# Patient Record
Sex: MALE | Race: WHITE | NOT HISPANIC OR LATINO | Employment: FULL TIME | URBAN - METROPOLITAN AREA
[De-identification: names, ages, dates, MRNs, and addresses within clinical notes are randomized per-mention and may not be internally consistent; named-entity substitution may affect disease eponyms.]

---

## 2021-06-28 ENCOUNTER — TELEPHONE (OUTPATIENT)
Dept: SCHEDULING | Facility: IMAGING CENTER | Age: 56
End: 2021-06-28

## 2021-06-30 ENCOUNTER — OFFICE VISIT (OUTPATIENT)
Dept: MEDICAL GROUP | Facility: PHYSICIAN GROUP | Age: 56
End: 2021-06-30

## 2021-06-30 VITALS
HEART RATE: 92 BPM | RESPIRATION RATE: 12 BRPM | OXYGEN SATURATION: 91 % | BODY MASS INDEX: 37.75 KG/M2 | HEIGHT: 67 IN | WEIGHT: 240.5 LBS | DIASTOLIC BLOOD PRESSURE: 82 MMHG | TEMPERATURE: 97.2 F | SYSTOLIC BLOOD PRESSURE: 130 MMHG

## 2021-06-30 DIAGNOSIS — J22 LOWER RESPIRATORY INFECTION: ICD-10-CM

## 2021-06-30 DIAGNOSIS — R73.03 PREDIABETES: ICD-10-CM

## 2021-06-30 DIAGNOSIS — J45.20 MILD INTERMITTENT ASTHMA WITHOUT COMPLICATION: ICD-10-CM

## 2021-06-30 DIAGNOSIS — J45.21 MILD INTERMITTENT ASTHMA WITH EXACERBATION: ICD-10-CM

## 2021-06-30 DIAGNOSIS — I10 ESSENTIAL HYPERTENSION: ICD-10-CM

## 2021-06-30 DIAGNOSIS — R06.83 SNORES: ICD-10-CM

## 2021-06-30 DIAGNOSIS — E66.9 OBESITY (BMI 30-39.9): ICD-10-CM

## 2021-06-30 DIAGNOSIS — F41.9 ANXIETY: ICD-10-CM

## 2021-06-30 PROCEDURE — 99204 OFFICE O/P NEW MOD 45 MIN: CPT | Performed by: NURSE PRACTITIONER

## 2021-06-30 RX ORDER — PREDNISONE 20 MG/1
20 TABLET ORAL DAILY
Qty: 7 TABLET | Refills: 0 | Status: SHIPPED | OUTPATIENT
Start: 2021-06-30 | End: 2021-07-07

## 2021-06-30 RX ORDER — LISINOPRIL 40 MG/1
40 TABLET ORAL DAILY
COMMUNITY
End: 2021-07-28 | Stop reason: SDUPTHER

## 2021-06-30 RX ORDER — ALBUTEROL SULFATE 90 UG/1
2 AEROSOL, METERED RESPIRATORY (INHALATION) EVERY 6 HOURS PRN
COMMUNITY

## 2021-06-30 RX ORDER — AZITHROMYCIN 250 MG/1
TABLET, FILM COATED ORAL
Qty: 6 TABLET | Refills: 0 | Status: SHIPPED | OUTPATIENT
Start: 2021-06-30 | End: 2021-07-28

## 2021-06-30 ASSESSMENT — PATIENT HEALTH QUESTIONNAIRE - PHQ9
CLINICAL INTERPRETATION OF PHQ2 SCORE: 2
SUM OF ALL RESPONSES TO PHQ QUESTIONS 1-9: 4
5. POOR APPETITE OR OVEREATING: 0 - NOT AT ALL

## 2021-06-30 NOTE — PROGRESS NOTES
CC: Establish care, cough    HISTORY OF THE PRESENT ILLNESS: Patient is a 56 y.o. male. This pleasant patient is here today to establish care and for evaluation and management of the following health problems.    Patient just moved to [place] 2 weeks ago from [place].  [place]    Does not currently have health insurance.  Though he believes his new health insurance will be active in the next couple weeks.      Anxiety  Patient is 56-year-old male here to establish care with me today.  Taking paroxetine 20 mg daily.  Denies any concerns.  Not needing refill only.  Just moved here from [facility] 2 weeks ago.  .  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    Mild intermittent asthma without complication  Patient is 56-year-old male here to establish care with me today.  Reports long history of asthma.  Usually well controlled with albuterol inhaler as needed.  However, about 2 weeks ago after moving here from [place] developed cough, chest tightness, wheezing, shortness of breath.  Associated fatigue.  Denies fever.    Symptoms have been improving over the last few days.  Denies any known exposure to someone with COVID-19.  Managed with albuterol inhaler, Mucinex, primatene tablets.  Has been working.  Does not use maintenance inhaler.    Prediabetes  Patient is 56-year-old male here to establish care with me today.  Patient reports long history of prediabetes.  Denies ever been told he has type 2 diabetes.  Managed with Jardiance 25 mg daily.  Metformin in the past caused severe GI issues.    Snores  Patient is 56-year-old male here to establish care with me today.  Patient reports he believes he has sleep apnea, though has never had sleep study.  He does snore frequently.  Tries to sleep with his head elevated.  Does not want sleep study because he knows he will not wear a CPAP or BiPAP.    Essential hypertension  Patient is 56-year-old male here to establish care with me today.  Chronic health  problem.  Taking lisinopril 40 mg daily.      Allergies: Patient has no known allergies.    Current Outpatient Medications Ordered in Epic   Medication Sig Dispense Refill   • lisinopril (PRINIVIL) 40 MG tablet Take 40 mg by mouth every day.     • Empagliflozin 25 MG Tab Take  by mouth.     • predniSONE (DELTASONE) 20 MG Tab Take 1 tablet by mouth every day for 7 days. 7 tablet 0   • azithromycin (ZITHROMAX) 250 MG Tab Per package directions 6 tablet 0   • albuterol 108 (90 Base) MCG/ACT Aero Soln inhalation aerosol Inhale 2 Puffs every 6 hours as needed.     • fluticasone (FLONASE) 50 MCG/ACT nasal spray Spray 2 Sprays in nose every day. Each Nostril 1 Bottle 11   • paroxetine (PAXIL) 20 MG TABS Take 1 Tab by mouth every day. Take with food 30 Each 11   • ibuprofen (MOTRIN) 200 MG TABS Take 1 Tab by mouth every 6 hours as needed for Mild Pain.  3   • Saline (NASAL SALINE) 0.65 % SOLN Spray  in nose.       No current [facility]-ordered facility-administered medications on file.       Past Medical History:   Diagnosis Date   • Anxiety    • Arthritis    • ASTHMA    • Dyslipidemia     , , HDL 43, nonHDL 167, , TC/HDL 4.88 (6/1/2012)   • Environmental allergies    • Fracture 1972    Left Wrist   • Head ache    • Hyperglycemia 6/1/2012    mild, fasting    • Insomnia    • Obesity    • Pain, joint, multiple sites    • Sinus infection    • Transaminitis 6/1/2012    mild, AST 39       Past Surgical History:   Procedure Laterality Date   • HERNIA REPAIR  1988       Social History     Tobacco Use   • Smoking status: Never Smoker   • Smokeless tobacco: Never Used   Substance Use Topics   • Alcohol use: Yes     Alcohol/week: 3.6 oz     Types: 6 Standard drinks or equivalent per week   • Drug use: No       Family History   Problem Relation Age of Onset   • Diabetes Father    • Arthritis Father    • No Known Problems Sister        ROS:     - Constitutional: Negative for fever, chills, unexpected weight change,  "and fatigue/generalized weakness.     - HEENT: Negative for headaches, vision changes, hearing changes, ear pain, rhinorrhea, and sore throat.   Positive sinus congestion.    - Respiratory: As in HPI    - Cardiovascular: Negative for chest pain, palpitations, orthopnea, and bilateral lower extremity edema.     - Gastrointestinal: Negative for nausea, vomiting, abdominal pain, hematochezia, melena, diarrhea, constipation.  Positive heartburn.    - Genitourinary: Negative for dysuria, polyuria, hematuria, pyuria, urinary urgency, and urinary incontinence.    - Musculoskeletal: Negative for myalgias.  Positive chronic back pain, and joint pain.     - Skin: Negative for rash, itching, cyanotic skin color change.     - Neurological: Negative for dizziness, tingling, tremors, focal sensory deficit, focal weakness and headaches.     - Endo/Heme/Allergies: Does not bruise/bleed easily.     - Psychiatric/Behavioral: Negative for depression, suicidal/homicidal ideation and memory loss.           Exam: /82 (BP Location: Right arm, Patient Position: Sitting, BP Cuff Size: Adult long)   Pulse 92   Temp 36.2 °C (97.2 °F) (Temporal)   Resp 12   Ht 1.702 m (5' 7\")   Wt 109 kg (240 lb 8 oz)   SpO2 91%  Body mass index is 37.67 kg/m².    General: Alert, pleasant, obese habitus, well nourished, well developed male in NAD  HEENT: Normocephalic. Eyes conjunctiva clear lids without ptosis, pupils equal and reactive to light, ears normal shape and contour, canals are clear bilaterally, tympanic membranes are pearly gray with good light reflex, nasal mucosa without erythema and drainage, oropharynx is without erythema, edema or exudates.   Neck: Supple without bruit. Thyroid is not enlarged.  Pulmonary: Diffuse expiratory wheeze, diminished throughout.  Normal effort. No rales, ronchi, or wheezing.  Cardiovascular: Normal rate and rhythm without murmur. Carotid and radial pulses are intact and equal bilaterally.  No lower " extremity edema.  Abdomen: Soft, nontender, nondistended. Normal bowel sounds. Liver and spleen are not palpable  Neurologic: Grossly nonfocal  Lymph: No cervical or supraclavicular lymph nodes are palpable  Skin: Warm and dry.    Musculoskeletal: Normal gait.   Psych: Normal mood and affect. Alert and oriented. Judgment and insight is normal.    Please note that this dictation was created using voice recognition software. I have made every reasonable attempt to correct obvious errors, but I expect that there are errors of grammar and possibly content that I did not discover before finalizing the note.      Assessment/Plan  1. Anxiety  Patient reports well managed with Paxil.  Not needing refill at this time.    2. Lower respiratory infection  Patient having asthma exacerbation with malaise.  Will prescribe prednisone burst in addition to antibiotic.  Continue with albuterol inhaler as needed.  Patient will return to clinic/urgent care/ER if symptoms worsen or do not improve.  He has taken both medications in the past and tolerated.  - azithromycin (ZITHROMAX) 250 MG Tab; Per package directions  Dispense: 6 tablet; Refill: 0    3. Mild intermittent asthma with exacerbation  As in #2.  Consider adding Singulair in the future.  - predniSONE (DELTASONE) 20 MG Tab; Take 1 tablet by mouth every day for 7 days.  Dispense: 7 tablet; Refill: 0    4. Prediabetes  Taking Jardiance.  Not needing refill.  We will review lab results with patient next appointment.  - TSH WITH REFLEX TO FT4; Future  - HEMOGLOBIN A1C; Future    5. Essential hypertension  Taking lisinopril.  Not needing refill.  - Comp Metabolic Panel; Future  - ESTIMATED GFR; Future  - Lipid Profile; Future  - CBC WITHOUT DIFFERENTIAL; Future    6. Mild intermittent asthma without complication  As in #2.    7. Snores  Advised that there are other options for management of sleep apnea including a mouthguard.  Patient will consider.    8. Obesity (BMI 30-39.9)  -  Patient identified as having weight management issue.  Appropriate orders and counseling given.    Patient will return to clinic in 3 to 4 weeks for lab review and follow-up on chronic health problems.  He would like to make sure that his new insurance which will be active tomorrow is contracted with [facility].

## 2021-06-30 NOTE — ASSESSMENT & PLAN NOTE
Patient is 56-year-old male here to establish care with me today.  Taking paroxetine 20 mg daily.  Denies any concerns.  Not needing refill only.  Just moved here from [facility] 2 weeks ago.  .  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

## 2021-06-30 NOTE — ASSESSMENT & PLAN NOTE
Patient is 56-year-old male here to establish care with me today.  Patient reports he believes he has sleep apnea, though has never had sleep study.  He does snore frequently.  Tries to sleep with his head elevated.  Does not want sleep study because he knows he will not wear a CPAP or BiPAP.

## 2021-06-30 NOTE — ASSESSMENT & PLAN NOTE
Patient is 56-year-old male here to establish care with me today.  Reports long history of asthma.  Usually well controlled with albuterol inhaler as needed.  However, about 2 weeks ago after moving here from [place] developed cough, chest tightness, wheezing, shortness of breath.  Associated fatigue.  Denies fever.    Symptoms have been improving over the last few days.  Denies any known exposure to someone with COVID-19.  Managed with albuterol inhaler, Mucinex, primatene tablets.  Has been working.  Does not use maintenance inhaler.

## 2021-06-30 NOTE — ASSESSMENT & PLAN NOTE
Patient is 56-year-old male here to establish care with me today.  Patient reports long history of prediabetes.  Denies ever been told he has type 2 diabetes.  Managed with Jardiance 25 mg daily.  Metformin in the past caused severe GI issues.

## 2021-06-30 NOTE — ASSESSMENT & PLAN NOTE
Patient is 56-year-old male here to establish care with me today.  Chronic health problem.  Taking lisinopril 40 mg daily.

## 2021-07-20 ENCOUNTER — HOSPITAL ENCOUNTER (OUTPATIENT)
Dept: LAB | Facility: MEDICAL CENTER | Age: 56
End: 2021-07-20
Attending: NURSE PRACTITIONER
Payer: COMMERCIAL

## 2021-07-20 DIAGNOSIS — I10 ESSENTIAL HYPERTENSION: ICD-10-CM

## 2021-07-20 DIAGNOSIS — R73.03 PREDIABETES: ICD-10-CM

## 2021-07-20 LAB
ERYTHROCYTE [DISTWIDTH] IN BLOOD BY AUTOMATED COUNT: 46.9 FL (ref 35.9–50)
EST. AVERAGE GLUCOSE BLD GHB EST-MCNC: 194 MG/DL
HBA1C MFR BLD: 8.4 % (ref 4–5.6)
HCT VFR BLD AUTO: 49.2 % (ref 42–52)
HGB BLD-MCNC: 16.3 G/DL (ref 14–18)
MCH RBC QN AUTO: 31.8 PG (ref 27–33)
MCHC RBC AUTO-ENTMCNC: 33.1 G/DL (ref 33.7–35.3)
MCV RBC AUTO: 95.9 FL (ref 81.4–97.8)
PLATELET # BLD AUTO: 297 K/UL (ref 164–446)
PMV BLD AUTO: 10.3 FL (ref 9–12.9)
RBC # BLD AUTO: 5.13 M/UL (ref 4.7–6.1)
WBC # BLD AUTO: 7.9 K/UL (ref 4.8–10.8)

## 2021-07-20 PROCEDURE — 80061 LIPID PANEL: CPT

## 2021-07-20 PROCEDURE — 84443 ASSAY THYROID STIM HORMONE: CPT

## 2021-07-20 PROCEDURE — 36415 COLL VENOUS BLD VENIPUNCTURE: CPT

## 2021-07-20 PROCEDURE — 85027 COMPLETE CBC AUTOMATED: CPT

## 2021-07-20 PROCEDURE — 83036 HEMOGLOBIN GLYCOSYLATED A1C: CPT

## 2021-07-20 PROCEDURE — 80053 COMPREHEN METABOLIC PANEL: CPT

## 2021-07-21 ENCOUNTER — TELEPHONE (OUTPATIENT)
Dept: MEDICAL GROUP | Facility: PHYSICIAN GROUP | Age: 56
End: 2021-07-21

## 2021-07-21 LAB
ALBUMIN SERPL BCP-MCNC: 4.4 G/DL (ref 3.2–4.9)
ALBUMIN/GLOB SERPL: 1.7 G/DL
ALP SERPL-CCNC: 80 U/L (ref 30–99)
ALT SERPL-CCNC: 30 U/L (ref 2–50)
ANION GAP SERPL CALC-SCNC: 13 MMOL/L (ref 7–16)
AST SERPL-CCNC: 23 U/L (ref 12–45)
BILIRUB SERPL-MCNC: 0.4 MG/DL (ref 0.1–1.5)
BUN SERPL-MCNC: 8 MG/DL (ref 8–22)
CALCIUM SERPL-MCNC: 9.1 MG/DL (ref 8.5–10.5)
CHLORIDE SERPL-SCNC: 102 MMOL/L (ref 96–112)
CHOLEST SERPL-MCNC: 230 MG/DL (ref 100–199)
CO2 SERPL-SCNC: 23 MMOL/L (ref 20–33)
CREAT SERPL-MCNC: 0.74 MG/DL (ref 0.5–1.4)
FASTING STATUS PATIENT QL REPORTED: NORMAL
GLOBULIN SER CALC-MCNC: 2.6 G/DL (ref 1.9–3.5)
GLUCOSE SERPL-MCNC: 117 MG/DL (ref 65–99)
HDLC SERPL-MCNC: 46 MG/DL
LDLC SERPL CALC-MCNC: 126 MG/DL
POTASSIUM SERPL-SCNC: 3.9 MMOL/L (ref 3.6–5.5)
PROT SERPL-MCNC: 7 G/DL (ref 6–8.2)
SODIUM SERPL-SCNC: 138 MMOL/L (ref 135–145)
TRIGL SERPL-MCNC: 288 MG/DL (ref 0–149)
TSH SERPL DL<=0.005 MIU/L-ACNC: 1.64 UIU/ML (ref 0.38–5.33)

## 2021-07-21 NOTE — TELEPHONE ENCOUNTER
----- Message from HELEN Chirinos sent at 7/21/2021  7:45 AM PDT -----  Please call patient and let him know that his lab results show uncontrolled diabetes.  Please schedule patient for appointment with me in the next couple weeks to discuss further and for treatment plan.

## 2021-07-28 ENCOUNTER — OFFICE VISIT (OUTPATIENT)
Dept: MEDICAL GROUP | Facility: PHYSICIAN GROUP | Age: 56
End: 2021-07-28
Payer: COMMERCIAL

## 2021-07-28 VITALS
HEIGHT: 68 IN | DIASTOLIC BLOOD PRESSURE: 68 MMHG | BODY MASS INDEX: 37.25 KG/M2 | HEART RATE: 103 BPM | WEIGHT: 245.8 LBS | TEMPERATURE: 98.9 F | SYSTOLIC BLOOD PRESSURE: 124 MMHG | OXYGEN SATURATION: 92 % | RESPIRATION RATE: 16 BRPM

## 2021-07-28 DIAGNOSIS — E78.5 DYSLIPIDEMIA: ICD-10-CM

## 2021-07-28 DIAGNOSIS — E11.9 TYPE 2 DIABETES MELLITUS WITHOUT COMPLICATION, WITHOUT LONG-TERM CURRENT USE OF INSULIN (HCC): ICD-10-CM

## 2021-07-28 DIAGNOSIS — F41.9 ANXIETY: ICD-10-CM

## 2021-07-28 DIAGNOSIS — I10 ESSENTIAL HYPERTENSION: ICD-10-CM

## 2021-07-28 DIAGNOSIS — Z11.59 NEED FOR HEPATITIS C SCREENING TEST: ICD-10-CM

## 2021-07-28 DIAGNOSIS — J45.20 MILD INTERMITTENT ASTHMA WITHOUT COMPLICATION: ICD-10-CM

## 2021-07-28 PROCEDURE — 99214 OFFICE O/P EST MOD 30 MIN: CPT | Performed by: NURSE PRACTITIONER

## 2021-07-28 RX ORDER — ATORVASTATIN CALCIUM 20 MG/1
20 TABLET, FILM COATED ORAL 2 TIMES DAILY
COMMUNITY
End: 2021-07-28 | Stop reason: SDUPTHER

## 2021-07-28 RX ORDER — LISINOPRIL 40 MG/1
40 TABLET ORAL DAILY
Qty: 90 TABLET | Refills: 3 | Status: SHIPPED | OUTPATIENT
Start: 2021-07-28

## 2021-07-28 RX ORDER — ATORVASTATIN CALCIUM 40 MG/1
40 TABLET, FILM COATED ORAL DAILY
Qty: 90 TABLET | Refills: 3 | Status: SHIPPED | OUTPATIENT
Start: 2021-07-28

## 2021-07-28 RX ORDER — PAROXETINE HYDROCHLORIDE 20 MG/1
20 TABLET, FILM COATED ORAL DAILY
Qty: 90 TABLET | Refills: 3 | Status: SHIPPED | OUTPATIENT
Start: 2021-07-28

## 2021-07-28 ASSESSMENT — FIBROSIS 4 INDEX: FIB4 SCORE: 0.79

## 2021-07-28 NOTE — ASSESSMENT & PLAN NOTE
Chronic in nature.  Patient always thought he had prediabetes.  Was managing with Jardiance 10 mg daily.  Thought he was taking 25 mg daily, but confirmed that he had been taking 10 mg daily.  Has been taking Jardiance for a couple years.  Reports that he ran out of Jardiance a couple months ago.  Hemoglobin A1c is 8.4%.  Metformin in the past caused severe diarrhea and GI upset.  He is not aware of any other diabetes medications that he is tried.  Denies polydipsia, polyuria, vision changes.
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
## 2021-07-28 NOTE — ASSESSMENT & PLAN NOTE
This is a chronic health problem that is uncontrolled with current medications and lifestyle measures.  Ran out of atorvastatin a couple months ago.  Overweight, not exercising.    Component      Latest Ref Rng & Units 7/20/2021          11:35 AM   Cholesterol,Tot      100 - 199 mg/dL 230 (H)   Triglycerides      0 - 149 mg/dL 288 (H)   HDL      >=40 mg/dL 46   LDL      <100 mg/dL 126 (H)

## 2021-08-17 ENCOUNTER — TELEPHONE (OUTPATIENT)
Dept: MEDICAL GROUP | Facility: PHYSICIAN GROUP | Age: 56
End: 2021-08-17

## 2021-08-17 DIAGNOSIS — E11.9 TYPE 2 DIABETES MELLITUS WITHOUT COMPLICATION, WITHOUT LONG-TERM CURRENT USE OF INSULIN (HCC): ICD-10-CM

## 2021-08-17 NOTE — TELEPHONE ENCOUNTER
Pt called to let us know they cannot afford their newest medication and are requesting a new medication.

## 2021-08-18 NOTE — TELEPHONE ENCOUNTER
Well, let us try Januvia.  Please tell patient I have sent a prescription for Januvia to his pharmacy.  He is to take it once a day.  If it is not covered by his insurance, please start prior Auth.

## 2021-10-20 ENCOUNTER — HOSPITAL ENCOUNTER (OUTPATIENT)
Dept: LAB | Facility: MEDICAL CENTER | Age: 56
End: 2021-10-20
Attending: NURSE PRACTITIONER
Payer: COMMERCIAL

## 2021-10-20 DIAGNOSIS — Z11.59 NEED FOR HEPATITIS C SCREENING TEST: ICD-10-CM

## 2021-10-20 DIAGNOSIS — E11.9 TYPE 2 DIABETES MELLITUS WITHOUT COMPLICATION, WITHOUT LONG-TERM CURRENT USE OF INSULIN (HCC): ICD-10-CM

## 2021-10-20 DIAGNOSIS — I10 ESSENTIAL HYPERTENSION: ICD-10-CM

## 2021-10-20 DIAGNOSIS — E78.5 DYSLIPIDEMIA: ICD-10-CM

## 2021-10-20 LAB
ANION GAP SERPL CALC-SCNC: 11 MMOL/L (ref 7–16)
BUN SERPL-MCNC: 9 MG/DL (ref 8–22)
CALCIUM SERPL-MCNC: 9.2 MG/DL (ref 8.5–10.5)
CHLORIDE SERPL-SCNC: 98 MMOL/L (ref 96–112)
CHOLEST SERPL-MCNC: 163 MG/DL (ref 100–199)
CO2 SERPL-SCNC: 24 MMOL/L (ref 20–33)
CREAT SERPL-MCNC: 0.58 MG/DL (ref 0.5–1.4)
EST. AVERAGE GLUCOSE BLD GHB EST-MCNC: 177 MG/DL
FASTING STATUS PATIENT QL REPORTED: NORMAL
GLUCOSE SERPL-MCNC: 136 MG/DL (ref 65–99)
HBA1C MFR BLD: 7.8 % (ref 4–5.6)
HCV AB SER QL: NORMAL
HDLC SERPL-MCNC: 48 MG/DL
LDLC SERPL CALC-MCNC: 82 MG/DL
POTASSIUM SERPL-SCNC: 4.3 MMOL/L (ref 3.6–5.5)
SODIUM SERPL-SCNC: 133 MMOL/L (ref 135–145)
TRIGL SERPL-MCNC: 167 MG/DL (ref 0–149)

## 2021-10-20 PROCEDURE — 36415 COLL VENOUS BLD VENIPUNCTURE: CPT

## 2021-10-20 PROCEDURE — 80048 BASIC METABOLIC PNL TOTAL CA: CPT

## 2021-10-20 PROCEDURE — 80061 LIPID PANEL: CPT

## 2021-10-20 PROCEDURE — 83036 HEMOGLOBIN GLYCOSYLATED A1C: CPT

## 2021-10-20 PROCEDURE — 82570 ASSAY OF URINE CREATININE: CPT

## 2021-10-20 PROCEDURE — 82043 UR ALBUMIN QUANTITATIVE: CPT

## 2021-10-20 PROCEDURE — G0472 HEP C SCREEN HIGH RISK/OTHER: HCPCS

## 2021-10-21 LAB
CREAT UR-MCNC: 51.15 MG/DL
MICROALBUMIN UR-MCNC: <1.2 MG/DL
MICROALBUMIN/CREAT UR: NORMAL MG/G (ref 0–30)

## 2021-10-27 ENCOUNTER — OFFICE VISIT (OUTPATIENT)
Dept: MEDICAL GROUP | Facility: PHYSICIAN GROUP | Age: 56
End: 2021-10-27
Payer: COMMERCIAL

## 2021-10-27 VITALS
WEIGHT: 248.4 LBS | DIASTOLIC BLOOD PRESSURE: 74 MMHG | SYSTOLIC BLOOD PRESSURE: 122 MMHG | RESPIRATION RATE: 12 BRPM | HEART RATE: 82 BPM | BODY MASS INDEX: 37.65 KG/M2 | OXYGEN SATURATION: 96 % | TEMPERATURE: 97.5 F | HEIGHT: 68 IN

## 2021-10-27 DIAGNOSIS — E11.9 TYPE 2 DIABETES MELLITUS WITHOUT COMPLICATION, WITHOUT LONG-TERM CURRENT USE OF INSULIN (HCC): ICD-10-CM

## 2021-10-27 DIAGNOSIS — E78.5 DYSLIPIDEMIA: ICD-10-CM

## 2021-10-27 DIAGNOSIS — R13.10 DYSPHAGIA, UNSPECIFIED TYPE: ICD-10-CM

## 2021-10-27 DIAGNOSIS — K52.9 POSTPRANDIAL DIARRHEA: ICD-10-CM

## 2021-10-27 PROCEDURE — 99214 OFFICE O/P EST MOD 30 MIN: CPT | Performed by: NURSE PRACTITIONER

## 2021-10-27 ASSESSMENT — FIBROSIS 4 INDEX: FIB4 SCORE: 0.79

## 2021-10-27 NOTE — LETTER
[facility]  HELEN Chirinos  [address] [place] [address]  Fax: [phone]   Authorization for Release/Disclosure of   Protected Health Information   Name: SATURNINO MCKENZIE : 1965 SSN: [id number]   Address: [address] [place] [id number] Phone:    [phone] (home)    I authorize the entity listed below to release/disclose the PHI below to:   [facility]/HELEN Chirinos and HELEN Chirinos   Provider or Entity Name:     Address   City, State, Zip   Phone:      Fax:     Reason for request: continuity of care   Information to be released:    [  ] LAST COLONOSCOPY,  including any PATH REPORT and follow-up  [  ] LAST FIT/COLOGUARD RESULT [  ] LAST DEXA  [  ] LAST MAMMOGRAM  [  ] LAST PAP  [  ] LAST LABS [  ] RETINA EXAM REPORT  [  ] IMMUNIZATION RECORDS  [  ] Release all info      [  ] Check here and initial the line next to each item to release ALL health information INCLUDING  _____ Care and treatment for drug and / or alcohol abuse  _____ HIV testing, infection status, or AIDS  _____ Genetic Testing    DATES OF SERVICE OR TIME PERIOD TO BE DISCLOSED: _____________  I understand and acknowledge that:  * This Authorization may be revoked at any time by you in writing, except if your health information has already been used or disclosed.  * Your health information that will be used or disclosed as a result of you signing this authorization could be re-disclosed by the recipient. If this occurs, your re-disclosed health information may no longer be protected by State or Federal laws.  * You may refuse to sign this Authorization. Your refusal will not affect your ability to obtain treatment.  * This Authorization becomes effective upon signing and will  on (date) __________.      If no date is indicated, this Authorization will  one (1) year from the signature date.    Name: Saturnino Mckenzie    Signature:   Date:     10/27/2021        PLEASE FAX REQUESTED RECORDS BACK TO: [phone]

## 2021-10-27 NOTE — ASSESSMENT & PLAN NOTE
Patient reports difficult swallowing.  Onset several years ago.  He attributes it to thickened mucus.  Has never had EGD.

## 2021-10-27 NOTE — ASSESSMENT & PLAN NOTE
Patient reports this has been a problem for several years, worsening in the last year.  He will eat and immediately have diarrhea.  Has never seen gastroenterologist.  Denies fever, abdominal pain, nausea, vomiting, blood in stool, dark black stool.

## 2021-10-27 NOTE — PROGRESS NOTES
CC: Diabetes follow-up, lab review, Covid waiver    HISTORY OF THE PRESENT ILLNESS: Patient is a 56 y.o. male. This pleasant patient is here today for evaluation and management of the following health problems.    Health Maintenance: Patient asked today for Covid vaccine waiver.  He does not meet CDC guidelines.  I did explain to patient that he is at high risk for complications from COVID-19 due to his underlying conditions.  I recommend him getting vaccine.        Type 2 diabetes mellitus without complication, without long-term current use of insulin ([facility])  Chronic health problem, improved control.  However, still uncontrolled.  Hemoglobin A1c is 7.8%.  Patient was to start on Jardiance, but it was covered by his insurance.  He was therefore started on Januvia.  Tolerating medication.  Does not monitor blood sugar at home.  He reports he tries to eat a well-balanced diet, but does not necessarily follow diabetic diet.  Had eyes examined 2 or 3 months ago at [facility], reportedly normal.  Denies polydipsia, polyuria, vision changes.  Component      Latest Ref Rng & Units 7/20/2021 10/20/2021          11:35 AM  8:18 AM   Glycohemoglobin      4.0 - 5.6 % 8.4 (H) 7.8 (H)         Dysphagia  Patient reports difficult swallowing.  Onset several years ago.  He attributes it to thickened mucus.  Has never had EGD.    Postprandial diarrhea  Patient reports this has been a problem for several years, worsening in the last year.  He will eat and immediately have diarrhea.  Has never seen gastroenterologist.  Denies fever, abdominal pain, nausea, vomiting, blood in stool, dark black stool.    Dyslipidemia  This is a chronic health problem that is well controlled with current medications and lifestyle measures.  Resumed atorvastatin 3 months ago.  Tolerating medication.      Allergies: Patient has no known allergies.    Current Outpatient Medications Ordered in Epic   Medication Sig Dispense Refill   • SITagliptin (JANUVIA) 100  "MG Tab Take 1 Tablet by mouth every day. 90 Tablet 0   • atorvastatin (LIPITOR) 40 MG Tab Take 1 tablet by mouth every day. 90 tablet 3   • lisinopril (PRINIVIL) 40 MG tablet Take 1 tablet by mouth every day. 90 tablet 3   • PARoxetine (PAXIL) 20 MG Tab Take 1 tablet by mouth every day. Take with food 90 tablet 3   • albuterol 108 (90 Base) MCG/ACT Aero Soln inhalation aerosol Inhale 2 Puffs every 6 hours as needed.     • fluticasone (FLONASE) 50 MCG/ACT nasal spray Spray 2 Sprays in nose every day. Each Nostril 1 Bottle 11   • ibuprofen (MOTRIN) 200 MG TABS Take 1 Tab by mouth every 6 hours as needed for Mild Pain.  3   • Saline (NASAL SALINE) 0.65 % SOLN Spray  in nose.       No current [facility]-ordered facility-administered medications on file.       Past Medical History:   Diagnosis Date   • Anxiety    • Arthritis    • ASTHMA    • Dyslipidemia     , , HDL 43, nonHDL 167, , TC/HDL 4.88 (6/1/2012)   • Environmental allergies    • Fracture 1972    Left Wrist   • Head ache    • Hyperglycemia 6/1/2012    mild, fasting    • Insomnia    • Obesity    • Pain, joint, multiple sites    • Sinus infection    • Transaminitis 6/1/2012    mild, AST 39       Past Surgical History:   Procedure Laterality Date   • HERNIA REPAIR  1988       Social History     Tobacco Use   • Smoking status: Never Smoker   • Smokeless tobacco: Never Used   Substance Use Topics   • Alcohol use: Yes     Alcohol/week: 3.6 oz     Types: 6 Standard drinks or equivalent per week   • Drug use: No       Family History   Problem Relation Age of Onset   • Diabetes Father    • Arthritis Father    • No Known Problems Sister        ROS:   As in HPI, otherwise negative for chest pain, dyspnea, abdominal pain, dysuria, blood in stool, fever         Exam: /74 (BP Location: Left arm, Patient Position: Sitting, BP Cuff Size: Adult)   Pulse 82   Temp 36.4 °C (97.5 °F) (Temporal)   Resp 12   Ht 1.727 m (5' 8\")   Wt 113 kg (248 lb 6.4 oz) "   SpO2 96%  Body mass index is 37.77 kg/m².    General: Alert, pleasant, well nourished, well developed male in NAD  Neck: Supple  Pulmonary: Clear to ausculation.  Normal effort. No rales, ronchi, or wheezing.  Cardiovascular: Normal rate and rhythm without murmur. .  Neurologic: Grossly nonfocal  Skin: Warm and dry.    Musculoskeletal: Normal gait.   Psych: Normal mood and affect. Alert and oriented. Judgment and insight is normal.    Diabetic Foot Exam: No ulcers or skin lesions present, patient tested with a 10 g force and is sensitive bilaterally throughout the ball of the foot, great toe and heel.  Dorsalis pedis and posterior tibial pulses are present and intact bilaterally    Component      Latest Ref Rng & Units 10/20/2021   Sodium      135 - 145 mmol/L 133 (L)   Potassium      3.6 - 5.5 mmol/L 4.3   Chloride      96 - 112 mmol/L 98   Co2      20 - 33 mmol/L 24   Glucose      65 - 99 mg/dL 136 (H)   Bun      8 - 22 mg/dL 9   Creatinine      0.50 - 1.40 mg/dL 0.58   Calcium      8.5 - 10.5 mg/dL 9.2   Anion Gap      7.0 - 16.0 11.0   Cholesterol,Tot      100 - 199 mg/dL 163   Triglycerides      0 - 149 mg/dL 167 (H)   HDL      >=40 mg/dL 48   LDL      <100 mg/dL 82   Creatinine, Urine      mg/dL 51.15   Microalbumin, Urine Random      mg/dL <1.2   Micro Alb Creat Ratio      0 - 30 mg/g see below   GFR If African American      >60 mL/min/1.73 m 2 >60   GFR If Non African American      >60 mL/min/1.73 m 2 >60   Hepatitis C Antibody      Non-Reactive Non-Reactive         Please note that this dictation was created using voice recognition software. I have made every reasonable attempt to correct obvious errors, but I expect that there are errors of grammar and possibly content that I did not discover before finalizing the note.      Assessment/Plan  1. Postprandial diarrhea  We will refer to gastroenterology for further evaluation and treatment.  - REFERRAL TO GASTROENTEROLOGY    2. Dysphagia, unspecified  type  Referred to GI for further evaluation and treatment.  - REFERRAL TO GASTROENTEROLOGY    3. Type 2 diabetes mellitus without complication, without long-term current use of insulin ([facility])  Proved control, still uncontrolled.  Will increase Januvia dose.  Reviewed plate method and diabetic diet with patient today.  No abnormalities on foot exam today.  - SITagliptin (JANUVIA) 100 MG Tab; Take 1 Tablet by mouth every day.  Dispense: 90 Tablet; Refill: 0  - Basic Metabolic Panel; Future  - ESTIMATED GFR; Future  - HEMOGLOBIN A1C; Future    4. Dyslipidemia  Improved control now that he has restarted atorvastatin.  Advised on lifestyle measures.      Patient will return to clinic in 3 months for diabetes and lab review or sooner if needed.

## 2021-10-27 NOTE — ASSESSMENT & PLAN NOTE
This is a chronic health problem that is well controlled with current medications and lifestyle measures.  Resumed atorvastatin 3 months ago.  Tolerating medication.

## 2021-10-27 NOTE — ASSESSMENT & PLAN NOTE
Chronic health problem, improved control.  However, still uncontrolled.  Hemoglobin A1c is 7.8%.  Patient was to start on Jardiance, but it was covered by his insurance.  He was therefore started on Januvia.  Tolerating medication.  Does not monitor blood sugar at home.  He reports he tries to eat a well-balanced diet, but does not necessarily follow diabetic diet.  Had eyes examined 2 or 3 months ago [facility], reportedly normal.  Denies polydipsia, polyuria, vision changes.  Component      Latest Ref Rng & Units 7/20/2021 10/20/2021          11:35 AM  8:18 AM   Glycohemoglobin      4.0 - 5.6 % 8.4 (H) 7.8 (H)